# Patient Record
Sex: FEMALE | Race: OTHER | HISPANIC OR LATINO | Employment: FULL TIME | URBAN - METROPOLITAN AREA
[De-identification: names, ages, dates, MRNs, and addresses within clinical notes are randomized per-mention and may not be internally consistent; named-entity substitution may affect disease eponyms.]

---

## 2017-04-20 ENCOUNTER — EH NON-PROVIDER (OUTPATIENT)
Dept: OCCUPATIONAL MEDICINE | Facility: CLINIC | Age: 47
End: 2017-04-20

## 2017-04-20 DIAGNOSIS — Z23 NEED FOR HEPATITIS A VACCINATION: ICD-10-CM

## 2017-04-20 PROCEDURE — 90632 HEPA VACCINE ADULT IM: CPT | Performed by: PREVENTIVE MEDICINE

## 2017-05-23 ENCOUNTER — HOSPITAL ENCOUNTER (OUTPATIENT)
Facility: MEDICAL CENTER | Age: 47
End: 2017-05-23
Attending: FAMILY MEDICINE
Payer: COMMERCIAL

## 2017-05-23 LAB
ALBUMIN SERPL BCP-MCNC: 3.9 G/DL (ref 3.2–4.9)
ALBUMIN/GLOB SERPL: 1.4 G/DL
ALP SERPL-CCNC: 68 U/L (ref 30–99)
ALT SERPL-CCNC: 9 U/L (ref 2–50)
ANION GAP SERPL CALC-SCNC: 7 MMOL/L (ref 0–11.9)
AST SERPL-CCNC: 13 U/L (ref 12–45)
BASOPHILS # BLD AUTO: 0.4 % (ref 0–1.8)
BASOPHILS # BLD: 0.02 K/UL (ref 0–0.12)
BILIRUB SERPL-MCNC: 0.6 MG/DL (ref 0.1–1.5)
BUN SERPL-MCNC: 12 MG/DL (ref 8–22)
CALCIUM SERPL-MCNC: 8.8 MG/DL (ref 8.5–10.5)
CHLORIDE SERPL-SCNC: 108 MMOL/L (ref 96–112)
CHOLEST SERPL-MCNC: 167 MG/DL (ref 100–199)
CO2 SERPL-SCNC: 24 MMOL/L (ref 20–33)
CREAT SERPL-MCNC: 0.62 MG/DL (ref 0.5–1.4)
EOSINOPHIL # BLD AUTO: 0.12 K/UL (ref 0–0.51)
EOSINOPHIL NFR BLD: 2.6 % (ref 0–6.9)
ERYTHROCYTE [DISTWIDTH] IN BLOOD BY AUTOMATED COUNT: 46.6 FL (ref 35.9–50)
GFR SERPL CREATININE-BSD FRML MDRD: >60 ML/MIN/1.73 M 2
GLOBULIN SER CALC-MCNC: 2.8 G/DL (ref 1.9–3.5)
GLUCOSE SERPL-MCNC: 80 MG/DL (ref 65–99)
HCT VFR BLD AUTO: 38 % (ref 37–47)
HDLC SERPL-MCNC: 55 MG/DL
HGB BLD-MCNC: 11.9 G/DL (ref 12–16)
IMM GRANULOCYTES # BLD AUTO: 0.01 K/UL (ref 0–0.11)
IMM GRANULOCYTES NFR BLD AUTO: 0.2 % (ref 0–0.9)
LDLC SERPL CALC-MCNC: 92 MG/DL
LYMPHOCYTES # BLD AUTO: 1.1 K/UL (ref 1–4.8)
LYMPHOCYTES NFR BLD: 24 % (ref 22–41)
MCH RBC QN AUTO: 32.1 PG (ref 27–33)
MCHC RBC AUTO-ENTMCNC: 31.3 G/DL (ref 33.6–35)
MCV RBC AUTO: 102.4 FL (ref 81.4–97.8)
MONOCYTES # BLD AUTO: 0.32 K/UL (ref 0–0.85)
MONOCYTES NFR BLD AUTO: 7 % (ref 0–13.4)
NEUTROPHILS # BLD AUTO: 3.02 K/UL (ref 2–7.15)
NEUTROPHILS NFR BLD: 65.8 % (ref 44–72)
NRBC # BLD AUTO: 0.02 K/UL
NRBC BLD AUTO-RTO: 0.4 /100 WBC
PLATELET # BLD AUTO: 285 K/UL (ref 164–446)
PMV BLD AUTO: 11.2 FL (ref 9–12.9)
POTASSIUM SERPL-SCNC: 3.7 MMOL/L (ref 3.6–5.5)
PROT SERPL-MCNC: 6.7 G/DL (ref 6–8.2)
RBC # BLD AUTO: 3.71 M/UL (ref 4.2–5.4)
SODIUM SERPL-SCNC: 139 MMOL/L (ref 135–145)
TRIGL SERPL-MCNC: 99 MG/DL (ref 0–149)
TSH SERPL DL<=0.005 MIU/L-ACNC: 2.47 UIU/ML (ref 0.3–3.7)
WBC # BLD AUTO: 4.6 K/UL (ref 4.8–10.8)

## 2017-05-23 PROCEDURE — 85025 COMPLETE CBC W/AUTO DIFF WBC: CPT

## 2017-05-23 PROCEDURE — 84443 ASSAY THYROID STIM HORMONE: CPT

## 2017-05-23 PROCEDURE — 80053 COMPREHEN METABOLIC PANEL: CPT

## 2017-05-23 PROCEDURE — 80061 LIPID PANEL: CPT

## 2017-07-13 ENCOUNTER — HOSPITAL ENCOUNTER (OUTPATIENT)
Facility: MEDICAL CENTER | Age: 47
End: 2017-07-13
Attending: FAMILY MEDICINE
Payer: COMMERCIAL

## 2017-07-13 LAB
BASOPHILS # BLD AUTO: 0.3 % (ref 0–1.8)
BASOPHILS # BLD: 0.02 K/UL (ref 0–0.12)
EOSINOPHIL # BLD AUTO: 0.09 K/UL (ref 0–0.51)
EOSINOPHIL NFR BLD: 1.5 % (ref 0–6.9)
ERYTHROCYTE [DISTWIDTH] IN BLOOD BY AUTOMATED COUNT: 41.5 FL (ref 35.9–50)
FERRITIN SERPL-MCNC: 38.8 NG/ML (ref 10–291)
HCT VFR BLD AUTO: 37.3 % (ref 37–47)
HGB BLD-MCNC: 12 G/DL (ref 12–16)
IMM GRANULOCYTES # BLD AUTO: 0.01 K/UL (ref 0–0.11)
IMM GRANULOCYTES NFR BLD AUTO: 0.2 % (ref 0–0.9)
IRON SATN MFR SERPL: 18 % (ref 15–55)
IRON SERPL-MCNC: 66 UG/DL (ref 40–170)
LYMPHOCYTES # BLD AUTO: 1.58 K/UL (ref 1–4.8)
LYMPHOCYTES NFR BLD: 26.9 % (ref 22–41)
MCH RBC QN AUTO: 31.2 PG (ref 27–33)
MCHC RBC AUTO-ENTMCNC: 32.2 G/DL (ref 33.6–35)
MCV RBC AUTO: 96.9 FL (ref 81.4–97.8)
MONOCYTES # BLD AUTO: 0.38 K/UL (ref 0–0.85)
MONOCYTES NFR BLD AUTO: 6.5 % (ref 0–13.4)
NEUTROPHILS # BLD AUTO: 3.8 K/UL (ref 2–7.15)
NEUTROPHILS NFR BLD: 64.6 % (ref 44–72)
NRBC # BLD AUTO: 0 K/UL
NRBC BLD AUTO-RTO: 0 /100 WBC
PLATELET # BLD AUTO: 270 K/UL (ref 164–446)
PMV BLD AUTO: 11.1 FL (ref 9–12.9)
RBC # BLD AUTO: 3.85 M/UL (ref 4.2–5.4)
TIBC SERPL-MCNC: 367 UG/DL (ref 250–450)
WBC # BLD AUTO: 5.9 K/UL (ref 4.8–10.8)

## 2017-07-13 PROCEDURE — 83540 ASSAY OF IRON: CPT

## 2017-07-13 PROCEDURE — 83550 IRON BINDING TEST: CPT

## 2017-07-13 PROCEDURE — 82728 ASSAY OF FERRITIN: CPT

## 2017-07-13 PROCEDURE — 85025 COMPLETE CBC W/AUTO DIFF WBC: CPT

## 2017-08-08 ENCOUNTER — HOSPITAL ENCOUNTER (OUTPATIENT)
Facility: MEDICAL CENTER | Age: 47
End: 2017-08-08
Attending: FAMILY MEDICINE
Payer: COMMERCIAL

## 2017-08-08 LAB — CYTOLOGY REG CYTOL: NORMAL

## 2017-08-08 PROCEDURE — 88175 CYTOPATH C/V AUTO FLUID REDO: CPT

## 2017-09-22 ENCOUNTER — TELEPHONE (OUTPATIENT)
Dept: RADIOLOGY | Facility: MEDICAL CENTER | Age: 47
End: 2017-09-22

## 2017-09-25 ENCOUNTER — HOSPITAL ENCOUNTER (OUTPATIENT)
Dept: RADIOLOGY | Facility: MEDICAL CENTER | Age: 47
End: 2017-09-25
Attending: FAMILY MEDICINE
Payer: COMMERCIAL

## 2017-09-25 DIAGNOSIS — N64.9 DISORDER OF BREAST: ICD-10-CM

## 2017-09-25 PROCEDURE — G0279 TOMOSYNTHESIS, MAMMO: HCPCS

## 2017-10-01 ENCOUNTER — IMMUNIZATION (OUTPATIENT)
Dept: OCCUPATIONAL MEDICINE | Facility: CLINIC | Age: 47
End: 2017-10-01

## 2017-10-01 DIAGNOSIS — Z23 NEED FOR VACCINATION: ICD-10-CM

## 2017-10-01 PROCEDURE — 90686 IIV4 VACC NO PRSV 0.5 ML IM: CPT | Performed by: PREVENTIVE MEDICINE

## 2018-07-25 ENCOUNTER — HOSPITAL ENCOUNTER (OUTPATIENT)
Dept: LAB | Facility: MEDICAL CENTER | Age: 48
End: 2018-07-25
Payer: COMMERCIAL

## 2018-07-25 LAB
BDY FAT % MEASURED: 36.3 %
BP DIAS: 67 MMHG
BP SYS: 126 MMHG
CHOLEST SERPL-MCNC: 171 MG/DL (ref 100–199)
DIABETES HTDIA: NO
EVENT NAME HTEVT: NORMAL
FASTING HTFAS: YES
GLUCOSE SERPL-MCNC: 92 MG/DL (ref 65–99)
HDLC SERPL-MCNC: 53 MG/DL
HYPERTENSION HTHYP: NO
LDLC SERPL CALC-MCNC: 100 MG/DL
SCREENING LOC CITY HTCIT: NORMAL
SCREENING LOC STATE HTSTA: NORMAL
SCREENING LOCATION HTLOC: NORMAL
SMOKING HTSMO: NO
SUBSCRIBER ID HTSID: NORMAL
TRIGL SERPL-MCNC: 88 MG/DL (ref 0–149)

## 2018-07-25 PROCEDURE — S5190 WELLNESS ASSESSMENT BY NONPH: HCPCS

## 2018-07-25 PROCEDURE — 82947 ASSAY GLUCOSE BLOOD QUANT: CPT

## 2018-07-25 PROCEDURE — 36415 COLL VENOUS BLD VENIPUNCTURE: CPT

## 2018-07-25 PROCEDURE — 80061 LIPID PANEL: CPT

## 2018-08-13 ENCOUNTER — HOSPITAL ENCOUNTER (OUTPATIENT)
Facility: MEDICAL CENTER | Age: 48
End: 2018-08-13
Attending: NURSE PRACTITIONER
Payer: COMMERCIAL

## 2018-08-13 ENCOUNTER — OFFICE VISIT (OUTPATIENT)
Dept: MEDICAL GROUP | Facility: MEDICAL CENTER | Age: 48
End: 2018-08-13
Payer: COMMERCIAL

## 2018-08-13 VITALS
TEMPERATURE: 97.2 F | HEART RATE: 66 BPM | SYSTOLIC BLOOD PRESSURE: 116 MMHG | OXYGEN SATURATION: 97 % | WEIGHT: 166 LBS | RESPIRATION RATE: 16 BRPM | DIASTOLIC BLOOD PRESSURE: 72 MMHG | HEIGHT: 64 IN | BODY MASS INDEX: 28.34 KG/M2

## 2018-08-13 DIAGNOSIS — Z11.51 SPECIAL SCREENING EXAMINATION FOR HUMAN PAPILLOMAVIRUS (HPV): ICD-10-CM

## 2018-08-13 DIAGNOSIS — N88.8 MASS OF CERVIX: ICD-10-CM

## 2018-08-13 DIAGNOSIS — Z12.4 ROUTINE CERVICAL SMEAR: ICD-10-CM

## 2018-08-13 DIAGNOSIS — Z01.419 PAP SMEAR, LOW-RISK: ICD-10-CM

## 2018-08-13 PROCEDURE — 88175 CYTOPATH C/V AUTO FLUID REDO: CPT

## 2018-08-13 PROCEDURE — 99386 PREV VISIT NEW AGE 40-64: CPT | Performed by: NURSE PRACTITIONER

## 2018-08-13 PROCEDURE — 87624 HPV HI-RISK TYP POOLED RSLT: CPT

## 2018-08-13 ASSESSMENT — PATIENT HEALTH QUESTIONNAIRE - PHQ9: CLINICAL INTERPRETATION OF PHQ2 SCORE: 0

## 2018-08-13 NOTE — PROGRESS NOTES
"Subjective:      Mary Beth Kiser is a 47 y.o. female who presents with Establish Care (PAP )    CC: Patient is here today to establish care as well as requesting yearly Pap smear.        HPI Mary Beth Kiser states her last Pap smear was one year ago at her previous PCP office, , and it was normal. She does report something about cysts which may have been on her ovaries which were picked up on an ultrasound in the past by Dr. Barkley but was told everything was normal. She states she did go through a full year without a menstrual period but then returned 3 months ago. She has no abdominal pain and states she is otherwise healthy. She has varied but not currently sexually active because her spouse is not around.      eHistory reviewed. No pertinent family history.   Social History   Substance Use Topics   • Smoking status: Never Smoker   • Smokeless tobacco: Never Used   • Alcohol use No     No current outpatient prescriptions on file.     No current facility-administered medications for this visit.    History reviewed. No pertinent past medical history.    Review of Systems   All other systems reviewed and are negative.         Objective:     /72   Pulse 66   Temp 36.2 °C (97.2 °F)   Resp 16   Ht 1.626 m (5' 4\")   Wt 75.3 kg (166 lb)   SpO2 97%   BMI 28.49 kg/m²      Physical Exam   Constitutional: She is oriented to person, place, and time. She appears well-developed and well-nourished. No distress.   HENT:   Head: Normocephalic and atraumatic.   Right Ear: External ear normal.   Left Ear: External ear normal.   Nose: Nose normal.   Eyes: Right eye exhibits no discharge. Left eye exhibits no discharge.   Neck: Normal range of motion. Neck supple. No thyromegaly present.   Cardiovascular: Normal rate, regular rhythm and normal heart sounds.  Exam reveals no gallop and no friction rub.    No murmur heard.  Pulmonary/Chest: Effort normal and breath sounds normal. She has no wheezes. She " has no rales.   Abdominal: Hernia confirmed negative in the right inguinal area and confirmed negative in the left inguinal area.   Genitourinary: Vagina normal. Pelvic exam was performed with patient in the knee-chest position. There is no rash on the right labia. There is no rash on the left labia.   Genitourinary Comments: Nodular area palpated and visualized on cervix.   Musculoskeletal: She exhibits no edema or tenderness.   Neurological: She is alert and oriented to person, place, and time. She displays normal reflexes.   Skin: Skin is warm and dry. No rash noted. She is not diaphoretic.   Psychiatric: She has a normal mood and affect. Her behavior is normal. Judgment and thought content normal.   Nursing note and vitals reviewed.              Assessment/Plan:     1. Pap smear, low-risk    - THINPREP PAP WITH HPV; Future    2. Routine cervical smear    - THINPREP PAP WITH HPV; Future    3. Special screening examination for human papillomavirus (HPV)    - THINPREP PAP WITH HPV; Future    4. Mass of cervix  Patient has nodular area on cervix and also reports having gone over a year without menstrual period and then having vaginal bleeding again. Therefore, I have ordered a transvaginal ultrasound and will also send patient to gynecology for further evaluation. I will share patient's Pap smear results with her when they return.  - REFERRAL TO GYNECOLOGY  - US-GYN-PELVIS TRANSVAGINAL; Future

## 2018-08-14 DIAGNOSIS — Z01.419 PAP SMEAR, LOW-RISK: ICD-10-CM

## 2018-08-14 DIAGNOSIS — Z12.4 ROUTINE CERVICAL SMEAR: ICD-10-CM

## 2018-08-14 DIAGNOSIS — Z11.51 SPECIAL SCREENING EXAMINATION FOR HUMAN PAPILLOMAVIRUS (HPV): ICD-10-CM

## 2018-08-15 LAB
CYTOLOGY REG CYTOL: NORMAL
HPV HR 12 DNA CVX QL NAA+PROBE: NEGATIVE
HPV16 DNA SPEC QL NAA+PROBE: NEGATIVE
HPV18 DNA SPEC QL NAA+PROBE: NEGATIVE
SPECIMEN SOURCE: NORMAL

## 2018-09-19 ENCOUNTER — IMMUNIZATION (OUTPATIENT)
Dept: OCCUPATIONAL MEDICINE | Facility: CLINIC | Age: 48
End: 2018-09-19

## 2018-09-19 DIAGNOSIS — Z23 NEED FOR VACCINATION: ICD-10-CM

## 2018-09-19 PROCEDURE — 90686 IIV4 VACC NO PRSV 0.5 ML IM: CPT | Performed by: PREVENTIVE MEDICINE

## 2018-09-26 ENCOUNTER — APPOINTMENT (OUTPATIENT)
Dept: RADIOLOGY | Facility: MEDICAL CENTER | Age: 48
End: 2018-09-26
Attending: NURSE PRACTITIONER
Payer: COMMERCIAL

## 2018-10-03 ENCOUNTER — APPOINTMENT (OUTPATIENT)
Dept: RADIOLOGY | Facility: MEDICAL CENTER | Age: 48
End: 2018-10-03
Attending: NURSE PRACTITIONER
Payer: COMMERCIAL

## 2019-05-24 ENCOUNTER — HOSPITAL ENCOUNTER (OUTPATIENT)
Dept: RADIOLOGY | Facility: MEDICAL CENTER | Age: 49
End: 2019-05-24
Attending: NURSE PRACTITIONER
Payer: COMMERCIAL

## 2019-05-24 ENCOUNTER — OFFICE VISIT (OUTPATIENT)
Dept: URGENT CARE | Facility: PHYSICIAN GROUP | Age: 49
End: 2019-05-24
Payer: COMMERCIAL

## 2019-05-24 VITALS
DIASTOLIC BLOOD PRESSURE: 70 MMHG | OXYGEN SATURATION: 95 % | HEART RATE: 84 BPM | TEMPERATURE: 97.3 F | HEIGHT: 64 IN | RESPIRATION RATE: 14 BRPM | BODY MASS INDEX: 29.02 KG/M2 | WEIGHT: 170 LBS | SYSTOLIC BLOOD PRESSURE: 118 MMHG

## 2019-05-24 DIAGNOSIS — M54.41 ACUTE BILATERAL LOW BACK PAIN WITH BILATERAL SCIATICA: ICD-10-CM

## 2019-05-24 DIAGNOSIS — M43.10 ANTEROLISTHESIS: ICD-10-CM

## 2019-05-24 DIAGNOSIS — M54.42 ACUTE BILATERAL LOW BACK PAIN WITH BILATERAL SCIATICA: ICD-10-CM

## 2019-05-24 DIAGNOSIS — M54.42 ACUTE BILATERAL LOW BACK PAIN WITH BILATERAL SCIATICA: Primary | ICD-10-CM

## 2019-05-24 DIAGNOSIS — M51.36 DEGENERATIVE DISC DISEASE, LUMBAR: ICD-10-CM

## 2019-05-24 DIAGNOSIS — M54.41 ACUTE BILATERAL LOW BACK PAIN WITH BILATERAL SCIATICA: Primary | ICD-10-CM

## 2019-05-24 PROCEDURE — 72100 X-RAY EXAM L-S SPINE 2/3 VWS: CPT

## 2019-05-24 PROCEDURE — 99204 OFFICE O/P NEW MOD 45 MIN: CPT | Performed by: NURSE PRACTITIONER

## 2019-05-24 RX ORDER — METHYLPREDNISOLONE 4 MG/1
TABLET ORAL
Qty: 1 KIT | Refills: 0 | Status: SHIPPED | OUTPATIENT
Start: 2019-05-25 | End: 2022-11-20

## 2019-05-24 RX ORDER — CYCLOBENZAPRINE HCL 10 MG
10 TABLET ORAL 3 TIMES DAILY PRN
Qty: 30 TAB | Refills: 0 | Status: SHIPPED | OUTPATIENT
Start: 2019-05-24

## 2019-05-24 RX ORDER — METHYLPREDNISOLONE SODIUM SUCCINATE 125 MG/2ML
125 INJECTION, POWDER, LYOPHILIZED, FOR SOLUTION INTRAMUSCULAR; INTRAVENOUS ONCE
Status: COMPLETED | OUTPATIENT
Start: 2019-05-24 | End: 2019-05-24

## 2019-05-24 RX ADMIN — METHYLPREDNISOLONE SODIUM SUCCINATE 125 MG: 125 INJECTION, POWDER, LYOPHILIZED, FOR SOLUTION INTRAMUSCULAR; INTRAVENOUS at 13:51

## 2019-05-24 ASSESSMENT — ENCOUNTER SYMPTOMS
BOWEL INCONTINENCE: 0
FOCAL WEAKNESS: 0
RESPIRATORY NEGATIVE: 1
CARDIOVASCULAR NEGATIVE: 1
NUMBNESS: 0
LEG PAIN: 1
NEUROLOGICAL NEGATIVE: 1
BACK PAIN: 1
CONSTITUTIONAL NEGATIVE: 1
PARESTHESIAS: 0
WEAKNESS: 0
SENSORY CHANGE: 0
TINGLING: 0
PARESIS: 0
PERIANAL NUMBNESS: 0

## 2019-05-24 ASSESSMENT — PAIN SCALES - GENERAL: PAINLEVEL: 9=SEVERE PAIN

## 2019-05-24 NOTE — PATIENT INSTRUCTIONS
Back Pain, Adult  Back pain is very common in adults. The cause of back pain is rarely dangerous and the pain often gets better over time. The cause of your back pain may not be known. Some common causes of back pain include:  · Strain of the muscles or ligaments supporting the spine.  · Wear and tear (degeneration) of the spinal disks.  · Arthritis.  · Direct injury to the back.  For many people, back pain may return. Since back pain is rarely dangerous, most people can learn to manage this condition on their own.  Follow these instructions at home:  Watch your back pain for any changes. The following actions may help to lessen any discomfort you are feeling:  · Remain active. It is stressful on your back to sit or  one place for long periods of time. Do not sit, drive, or  one place for more than 30 minutes at a time. Take short walks on even surfaces as soon as you are able. Try to increase the length of time you walk each day.  · Exercise regularly as directed by your health care provider. Exercise helps your back heal faster. It also helps avoid future injury by keeping your muscles strong and flexible.  · Do not stay in bed. Resting more than 1-2 days can delay your recovery.  · Pay attention to your body when you bend and lift. The most comfortable positions are those that put less stress on your recovering back. Always use proper lifting techniques, including:  ¨ Bending your knees.  ¨ Keeping the load close to your body.  ¨ Avoiding twisting.  · Find a comfortable position to sleep. Use a firm mattress and lie on your side with your knees slightly bent. If you lie on your back, put a pillow under your knees.  · Avoid feeling anxious or stressed. Stress increases muscle tension and can worsen back pain. It is important to recognize when you are anxious or stressed and learn ways to manage it, such as with exercise.  · Take medicines only as directed by your health care provider.  Over-the-counter medicines to reduce pain and inflammation are often the most helpful. Your health care provider may prescribe muscle relaxant drugs. These medicines help dull your pain so you can more quickly return to your normal activities and healthy exercise.  · Apply ice to the injured area:  ¨ Put ice in a plastic bag.  ¨ Place a towel between your skin and the bag.  ¨ Leave the ice on for 20 minutes, 2-3 times a day for the first 2-3 days. After that, ice and heat may be alternated to reduce pain and spasms.  · Maintain a healthy weight. Excess weight puts extra stress on your back and makes it difficult to maintain good posture.  Contact a health care provider if:  · You have pain that is not relieved with rest or medicine.  · You have increasing pain going down into the legs or buttocks.  · You have pain that does not improve in one week.  · You have night pain.  · You lose weight.  · You have a fever or chills.  Get help right away if:  · You develop new bowel or bladder control problems.  · You have unusual weakness or numbness in your arms or legs.  · You develop nausea or vomiting.  · You develop abdominal pain.  · You feel faint.  This information is not intended to replace advice given to you by your health care provider. Make sure you discuss any questions you have with your health care provider.  Document Released: 12/18/2006 Document Revised: 04/27/2017 Document Reviewed: 04/21/2015  [employer] Interactive Patient Education © 2017 [employer]

## 2019-05-24 NOTE — PROGRESS NOTES
Subjective:     Mary Beth Kiser is a 48 y.o. female who presents for Back Pain (x this morning, lower back, pain radiates to both legs )       Back Pain   This is a new problem. The current episode started today. The problem has been gradually worsening since onset. The pain is present in the lumbar spine. The pain radiates to the right thigh and left thigh. The pain is at a severity of 9/10. Exacerbated by: movement, palpation. Associated symptoms include leg pain. Pertinent negatives include no bladder incontinence, bowel incontinence, numbness, paresis, paresthesias, perianal numbness, tingling or weakness. Treatments tried: Motrin. The treatment provided no relief.   Denies a history of back problems or surgeries. Denies trauma.    PMH:  has no past medical history of Breast cancer (HCC) or Diabetes (HCC).    MEDS:   Current Outpatient Prescriptions:   •  [START ON 5/25/2019] MethylPREDNISolone (MEDROL DOSEPAK) 4 MG Tablet Therapy Pack, Use as directed on package, Disp: 1 Kit, Rfl: 0  •  cyclobenzaprine (FLEXERIL) 10 MG Tab, Take 1 Tab by mouth 3 times a day as needed for Muscle Spasms., Disp: 30 Tab, Rfl: 0    ALLERGIES: No Known Allergies    SURGHX: No past surgical history on file.    SOCHX:  reports that she has never smoked. She has never used smokeless tobacco. She reports that she does not drink alcohol or use drugs.     FH: Reviewed with patient, not pertinent to this visit.     Review of Systems   Constitutional: Negative.  Negative for malaise/fatigue.   Respiratory: Negative.    Cardiovascular: Negative.    Gastrointestinal: Negative for bowel incontinence.   Genitourinary: Negative.  Negative for bladder incontinence.   Musculoskeletal: Positive for back pain.   Neurological: Negative.  Negative for tingling, sensory change, focal weakness, weakness, numbness and paresthesias.   All other systems reviewed and are negative.    Objective:     /70   Pulse 84   Temp 36.3 °C (97.3 °F)  "(Temporal)   Resp 14   Ht 1.626 m (5' 4\")   Wt 77.1 kg (170 lb)   SpO2 95%   BMI 29.18 kg/m²     Physical Exam   Constitutional: She is oriented to person, place, and time. She appears well-developed and well-nourished. She is cooperative.  Non-toxic appearance. No distress.   HENT:   Right Ear: External ear normal.   Left Ear: External ear normal.   Nose: Nose normal.   Mouth/Throat: Mucous membranes are normal.   Eyes: Conjunctivae and EOM are normal.   Neck: Normal range of motion.   Cardiovascular: Normal rate, regular rhythm, normal heart sounds and normal pulses.    Pulmonary/Chest: Effort normal and breath sounds normal. No respiratory distress. She has no decreased breath sounds.   Abdominal: Bowel sounds are normal.   Musculoskeletal: She exhibits no deformity.        Lumbar back: She exhibits decreased range of motion (limited due to pain), tenderness, pain and spasm. She exhibits no swelling, no deformity and no laceration.        Back:    Neurological: She is alert and oriented to person, place, and time. She has normal strength. No sensory deficit.   Skin: Skin is warm, dry and intact. Capillary refill takes less than 2 seconds.   Psychiatric: She has a normal mood and affect. Her behavior is normal.   Vitals reviewed.    X-ray of lumbar spine:    Narrative     5/24/2019 12:52 PM    HISTORY/REASON FOR EXAM: Atraumatic Pain    TECHNIQUE/ EXAM DESCRIPTION AND NUMBER OF VIEWS:  3 weightbearing views of the lumbar spine.    COMPARISON: None.    FINDINGS:  There are 5 non-rib bearing lumbar type vertebral bodies.    No acute fracture is detected. The vertebral body heights are maintained.    The alignment of the lumbar spine is notable for grade 1 L5/S1 anterolisthesis with pars interarticularis defects suspected. There is mild disc height loss    The intervertebral disk spaces are otherwise preserved.     Impression     Grade 1 L5/S1 anterolisthesis, moderate degenerative disc disease and probable pars " interarticularis defects     Reading Provider Reading Date   Lei Velasquez M.D. May 24, 2019     Signing Provider Signing Date Signing Time   Lei Velasquez M.D. May 24, 2019  1:19 PM        Assessment/Plan:     1. Acute bilateral low back pain with bilateral sciatica  - DX-LUMBAR SPINE-2 OR 3 VIEWS; Future  - REFERRAL TO SPINE SURGERY  - methylPREDNISolone sod succ (SOLU-MEDROL) 125 MG injection 125 mg; 2 mL by Intramuscular route Once.  - MethylPREDNISolone (MEDROL DOSEPAK) 4 MG Tablet Therapy Pack; Use as directed on package  Dispense: 1 Kit; Refill: 0  - cyclobenzaprine (FLEXERIL) 10 MG Tab; Take 1 Tab by mouth 3 times a day as needed for Muscle Spasms.  Dispense: 30 Tab; Refill: 0    2. Anterolisthesis  - REFERRAL TO SPINE SURGERY    3. Degenerative disc disease, lumbar  - REFERRAL TO SPINE SURGERY    X-ray of lumbar spine ordered. Radiology report reviewed. Significant for grade 1 L5/S1 anterolisthesis with moderate DDD and probably pars interarticularis defects.    Patient reports ibuprofen has not been helping with the pain.  Solu-Medrol 125 mg IM given in clinic. Patient tolerated well. Rx sent electronically for Medrol Dosepak, patient instructed to start tomorrow. Rx for muscle relaxant. Advised to avoid NSAIDs while on steroid therapy. May take acetaminophen, perform gentle range of motion exercises, gentle massage, and apply ice alternating with heat. Referral given for spine specialist evaluation.    Patient advised close monitoring and to: Return for 1) Symptoms don't improve or worsen, or go to ER, 2) Follow up with primary care in 7-10 days.    Differential diagnosis, natural history, supportive care, and indications for immediate follow-up discussed. All questions answered. Patient agrees with the plan of care.    Billing note: patient billed as a new patient as the patient has not received any professional medical services from myself or another provider of the same specialty who belongs to  the same group practice within the previous 3 years.

## 2019-05-24 NOTE — LETTER
May 24, 2019         Patient: Mary Beth Kiser   YOB: 1970   Date of Visit: 5/24/2019           To Whom it May Concern:    Mary Beth Kiser was seen in my clinic on 5/24/2019. She may be excused 3/24/2019 and 3/25/2019.  If you have any questions or concerns, please don't hesitate to call.        Sincerely,           MIKE Kaur.  Electronically Signed

## 2019-09-05 ENCOUNTER — HOSPITAL ENCOUNTER (OUTPATIENT)
Dept: LAB | Facility: MEDICAL CENTER | Age: 49
End: 2019-09-05
Payer: COMMERCIAL

## 2019-09-05 LAB
BDY FAT % MEASURED: 32.6 %
BP DIAS: 58 MMHG
BP SYS: 111 MMHG
CHOLEST SERPL-MCNC: 184 MG/DL (ref 100–199)
DIABETES HTDIA: NO
EVENT NAME HTEVT: NORMAL
FASTING HTFAS: YES
GLUCOSE SERPL-MCNC: 91 MG/DL (ref 65–99)
HDLC SERPL-MCNC: 52 MG/DL
HYPERTENSION HTHYP: NO
LDLC SERPL CALC-MCNC: 102 MG/DL
SCREENING LOC CITY HTCIT: NORMAL
SCREENING LOC STATE HTSTA: NORMAL
SCREENING LOCATION HTLOC: NORMAL
SMOKING HTSMO: NO
SUBSCRIBER ID HTSID: NORMAL
TRIGL SERPL-MCNC: 151 MG/DL (ref 0–149)

## 2019-09-05 PROCEDURE — S5190 WELLNESS ASSESSMENT BY NONPH: HCPCS

## 2019-09-05 PROCEDURE — 82947 ASSAY GLUCOSE BLOOD QUANT: CPT

## 2019-09-05 PROCEDURE — 80061 LIPID PANEL: CPT

## 2019-09-05 PROCEDURE — 36415 COLL VENOUS BLD VENIPUNCTURE: CPT

## 2019-09-24 ENCOUNTER — IMMUNIZATION (OUTPATIENT)
Dept: OCCUPATIONAL MEDICINE | Facility: CLINIC | Age: 49
End: 2019-09-24

## 2019-09-24 DIAGNOSIS — Z23 NEED FOR VACCINATION: ICD-10-CM

## 2019-09-24 PROCEDURE — 90686 IIV4 VACC NO PRSV 0.5 ML IM: CPT | Performed by: PREVENTIVE MEDICINE

## 2020-09-25 ENCOUNTER — IMMUNIZATION (OUTPATIENT)
Dept: OCCUPATIONAL MEDICINE | Facility: CLINIC | Age: 50
End: 2020-09-25

## 2020-09-25 DIAGNOSIS — Z23 NEED FOR VACCINATION: ICD-10-CM

## 2020-09-25 PROCEDURE — 90686 IIV4 VACC NO PRSV 0.5 ML IM: CPT | Performed by: NURSE PRACTITIONER

## 2020-12-20 DIAGNOSIS — Z23 NEED FOR VACCINATION: ICD-10-CM

## 2021-01-20 ENCOUNTER — HOSPITAL ENCOUNTER (OUTPATIENT)
Dept: LAB | Facility: MEDICAL CENTER | Age: 51
End: 2021-01-20
Attending: EMERGENCY MEDICINE
Payer: COMMERCIAL

## 2021-01-20 LAB
COVID ORDER STATUS COVID19: NORMAL
SARS-COV-2 RNA RESP QL NAA+PROBE: DETECTED
SPECIMEN SOURCE: ABNORMAL

## 2021-03-18 ENCOUNTER — IMMUNIZATION (OUTPATIENT)
Dept: FAMILY PLANNING/WOMEN'S HEALTH CLINIC | Facility: IMMUNIZATION CENTER | Age: 51
End: 2021-03-18
Attending: FAMILY MEDICINE
Payer: COMMERCIAL

## 2021-03-18 DIAGNOSIS — Z23 NEED FOR VACCINATION: ICD-10-CM

## 2021-03-18 DIAGNOSIS — Z23 ENCOUNTER FOR VACCINATION: Primary | ICD-10-CM

## 2021-03-18 PROCEDURE — 0011A MODERNA SARS-COV-2 VACCINE: CPT

## 2021-03-18 PROCEDURE — 91301 MODERNA SARS-COV-2 VACCINE: CPT

## 2021-04-15 ENCOUNTER — IMMUNIZATION (OUTPATIENT)
Dept: OTHER | Facility: MEDICAL CENTER | Age: 51
End: 2021-04-15
Attending: INTERNAL MEDICINE
Payer: COMMERCIAL

## 2021-04-15 DIAGNOSIS — Z23 ENCOUNTER FOR VACCINATION: Primary | ICD-10-CM

## 2021-04-15 PROCEDURE — 91301 MODERNA SARS-COV-2 VACCINE: CPT

## 2021-04-15 PROCEDURE — 0012A MODERNA SARS-COV-2 VACCINE: CPT

## 2022-01-11 ENCOUNTER — APPOINTMENT (OUTPATIENT)
Dept: PHARMACY | Facility: MEDICAL CENTER | Age: 52
End: 2022-01-11

## 2022-01-11 PROCEDURE — RXMED WILLOW AMBULATORY MEDICATION CHARGE: Performed by: INTERNAL MEDICINE

## 2022-01-12 ENCOUNTER — PHARMACY VISIT (OUTPATIENT)
Dept: PHARMACY | Facility: MEDICAL CENTER | Age: 52
End: 2022-01-12
Payer: COMMERCIAL

## 2022-09-27 ENCOUNTER — IMMUNIZATION (OUTPATIENT)
Dept: OCCUPATIONAL MEDICINE | Facility: CLINIC | Age: 52
End: 2022-09-27

## 2022-09-27 DIAGNOSIS — Z23 NEED FOR VACCINATION: Primary | ICD-10-CM

## 2022-09-30 PROCEDURE — 90686 IIV4 VACC NO PRSV 0.5 ML IM: CPT | Performed by: PREVENTIVE MEDICINE

## 2022-11-08 ENCOUNTER — APPOINTMENT (OUTPATIENT)
Dept: RADIOLOGY | Facility: IMAGING CENTER | Age: 52
End: 2022-11-08
Attending: PHYSICIAN ASSISTANT
Payer: COMMERCIAL

## 2022-11-08 ENCOUNTER — PHARMACY VISIT (OUTPATIENT)
Dept: PHARMACY | Facility: MEDICAL CENTER | Age: 52
End: 2022-11-08
Payer: COMMERCIAL

## 2022-11-08 ENCOUNTER — OCCUPATIONAL MEDICINE (OUTPATIENT)
Dept: URGENT CARE | Facility: CLINIC | Age: 52
End: 2022-11-08
Payer: COMMERCIAL

## 2022-11-08 VITALS
OXYGEN SATURATION: 96 % | DIASTOLIC BLOOD PRESSURE: 68 MMHG | HEART RATE: 72 BPM | HEIGHT: 63 IN | RESPIRATION RATE: 16 BRPM | BODY MASS INDEX: 29.3 KG/M2 | SYSTOLIC BLOOD PRESSURE: 108 MMHG | WEIGHT: 165.4 LBS | TEMPERATURE: 97.8 F

## 2022-11-08 DIAGNOSIS — S67.191A CRUSHING INJURY OF LEFT INDEX FINGER, INITIAL ENCOUNTER: ICD-10-CM

## 2022-11-08 DIAGNOSIS — L03.012 PARONYCHIA OF LEFT INDEX FINGER: ICD-10-CM

## 2022-11-08 DIAGNOSIS — Z02.1 PRE-EMPLOYMENT DRUG SCREENING: ICD-10-CM

## 2022-11-08 LAB
AMP AMPHETAMINE: NORMAL
BAR BARBITURATES: NORMAL
BREATH ALCOHOL COMMENT: NORMAL
BZO BENZODIAZEPINES: NORMAL
COC COCAINE: NORMAL
INT CON NEG: NORMAL
INT CON POS: NORMAL
MDMA ECSTASY: NORMAL
MET METHAMPHETAMINES: NORMAL
MTD METHADONE: NORMAL
OPI OPIATES: NORMAL
OXY OXYCODONE: NORMAL
PCP PHENCYCLIDINE: NORMAL
POC BREATHALIZER: 0 PERCENT (ref 0–0.01)
POC URINE DRUG SCREEN OCDRS: NEGATIVE
THC: NORMAL

## 2022-11-08 PROCEDURE — 82075 ASSAY OF BREATH ETHANOL: CPT | Performed by: PHYSICIAN ASSISTANT

## 2022-11-08 PROCEDURE — 80305 DRUG TEST PRSMV DIR OPT OBS: CPT | Performed by: PHYSICIAN ASSISTANT

## 2022-11-08 PROCEDURE — RXMED WILLOW AMBULATORY MEDICATION CHARGE: Performed by: PHYSICIAN ASSISTANT

## 2022-11-08 PROCEDURE — 73130 X-RAY EXAM OF HAND: CPT | Mod: TC,LT | Performed by: RADIOLOGY

## 2022-11-08 PROCEDURE — 99203 OFFICE O/P NEW LOW 30 MIN: CPT | Performed by: PHYSICIAN ASSISTANT

## 2022-11-08 RX ORDER — CEPHALEXIN 500 MG/1
500 CAPSULE ORAL 4 TIMES DAILY
Qty: 20 CAPSULE | Refills: 0 | Status: SHIPPED | OUTPATIENT
Start: 2022-11-08 | End: 2022-11-13

## 2022-11-08 NOTE — LETTER
"   [facility]  [address] [facility] [facility] [address] [place] [address]  Phone:  [phone] - Fax:  [phone]   [facility] Progress Report and Disability Certification  Date of Service: 11/8/2022   No Show:  No  Date / Time of Next Visit: 11/15/2022 @ 9:00 AM    Claim Information   Patient Name: Mary Beth Kiser  Claim Number:     Employer: [employer]  Date of Injury: 11/3/2022     Insurer / TPA:   Workers Choice ID / SSN:     Occupation: DIETARY  Diagnosis: Diagnoses of Crushing injury of left index finger, initial encounter and Paronychia of left index finger were pertinent to this visit.    Medical Information   Related to Industrial Injury? 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  She states she was holding \"bases\" that are very heavy.  She stacks them adjacent to her workspace and moves them throughout the day.  She states her finger was crushed between 2 stacks of bases.  Patient has tried OTC Tylenol Advil.  Patient has tried Band-Aids and bandaging of finger.  Notes increasing swelling redness and pain over the interim.  Denies past medical history of similar.  Denies other current employment.  Patient is right-hand dominant.   Objective Findings: Gen: AOx3; Head: NC AT; Eyes: PERRLA/EOM; Lungs: NLR; Cardiac: RR by periph pulse exam; Left index finger: Erythema and edema to distal phalanx and surrounding proximal and lateral nail folds, not pointing, no fluctuance, skin intact, no subungual hematoma, trace erythema proximal to DIPJ; neuro: Brisk capillary refill, normal sensation to light touch   Pre-Existing Condition(s):     Assessment:   Initial Visit    Status: Additional Care Required  Permanent Disability:No    Plan:   Comments:Restricted duty, 10 pound restriction to left upper extremity, Rx Keflex, take full course, take with probiotics, OTC NSAIDs/Tylenol, follow-up in " 7 days for repeat evaluation    Diagnostics: X-ray  Comments:NEG    Comments:       Disability Information   Status: Released to Restricted Duty    From:  11/8/2022  Through: 11/15/2022 Restrictions are: Temporary   Physical Restrictions   Sitting:    Standing:    Stooping:    Bending:      Squatting:    Walking:    Climbing:    Pushing:      Pulling:    Other:    Reaching Above Shoulder (L):   Reaching Above Shoulder (R):       Reaching Below Shoulder (L):    Reaching Below Shoulder (R):      Not to exceed Weight Limits   Carrying(hrs):   Weight Limit(lb): < or = to 10 pounds  Comments:LUE Lifting(hrs):   Weight  Limit(lb): < or = to 10 pounds  Comments:LUE   Comments: Restricted duty, 10 pound restriction to left upper extremity, Rx Keflex, take full course, take with probiotics, OTC NSAIDs/Tylenol, follow-up in 7 days for repeat evaluation      Repetitive Actions   Hands: i.e. Fine Manipulations from Grasping:     Feet: i.e. Operating Foot Controls:     Driving / Operate Machinery:     Health Care Provider’s Original or Electronic Signature  Schuyler Coronado P.A.-C. Health Care Provider’s Original or Electronic Signature    Arthur Carey MD         Clinic Name / Location: [address] [place] [address], [place] [address] Clinic Phone Number: Dept: [phone]   Appointment Time: 10:45 Am Visit Start Time: 11:58 AM   Check-In Time:  10:47 Am Visit Discharge Time:  12:52 PM    Original-Treating Physician or Chiropractor    Page 2-Insurer/TPA    Page 3-Employer    Page 4-Employee

## 2022-11-08 NOTE — PROGRESS NOTES
"Subjective:     Mary Beth Kiser is a 52 y.o. 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  She states she was holding \"bases\" that are very heavy.  She stacks them adjacent to her workspace and moves them throughout the day.  She states her finger was crushed between 2 stacks of bases.  Patient has tried OTC Tylenol Advil.  Patient has tried Band-Aids and bandaging of finger.  Notes increasing swelling redness and pain over the interim.  Denies past medical history of similar.  Denies other current employment.  Patient is right-hand dominant.    PMH:   No pertinent past medical history to this problem  MEDS:  Medications were reviewed in EMR  ALLERGIES:  Allergies were reviewed in EMR  FH:   No pertinent family history to this problem    DX hand -      FINDINGS: Bone mineralization is normal. There is no evidence of fracture or dislocation. Soft tissues are normal.     IMPRESSION:     No evidence of fracture or arthropathy.                 Exam Ended: 11/08/22 12:20 PM Last Resulted: 11/08/22 12:48 PM              Objective:     /68 (BP Location: Right arm, Patient Position: Sitting, BP Cuff Size: Adult long)   Pulse 72   Temp 36.6 °C (97.8 °F) (Temporal)   Resp 16   Ht 1.6 m (5' 3\")   Wt 75 kg (165 lb 6.4 oz)   SpO2 96%   BMI 29.30 kg/m²     Gen: AOx3; Head: NC AT; Eyes: PERRLA/EOM; Lungs: NLR; Cardiac: RR by periph pulse exam; Left index finger: Erythema and edema to distal phalanx and surrounding proximal and lateral nail folds, not pointing, no fluctuance, skin intact, no subungual hematoma, trace erythema proximal to DIPJ; neuro: Brisk capillary refill, normal sensation to light touch    Patient sent with aluminum finger splint to help protect distal digit.    Assessment/Plan:       1. Crushing injury of left index finger, initial encounter  - DX-HAND 3+ LEFT; " Future    2. Paronychia of left index finger  - DX-HAND 3+ LEFT; Future    Released to Restricted Duty FROM 11/8/2022 TO 11/15/2022  Restricted duty, 10 pound restriction to left upper extremity, Rx Keflex, take full course, take with probiotics, OTC NSAIDs/Tylenol, follow-up in 7 days for repeat evaluation         Differential diagnosis, natural history, supportive care, and indications for immediate follow-up discussed.    My total time spent caring for the patient on the day of the encounter was 31 minutes.   This does not include time spent on separately billable procedures/tests.

## 2022-11-08 NOTE — LETTER
"EMPLOYEE’S CLAIM FOR COMPENSATION/ REPORT OF INITIAL TREATMENT  FORM C-4    EMPLOYEE’S CLAIM - PROVIDE ALL INFORMATION REQUESTED   First Name  Mary Beth Last Name  Kiser Birthdate                    1970                Sex  female Claim Number (Insurer’s Use Only)    Home Address  [address] Age  52 y.o. 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 (if applicable)  Working    How did this injury or occupational disease occur? (Be specific an answer in detail. Use additional sheet if necessary)  Taking Bases   If you believe that you have an occupational disease, when did you first have knowledge of the disability and it relationship to your employment?  n/a Witnesses to the Accident  tootie stark      Nature of Injury or Occupational Disease  Defer  Part(s) of Body Injured or Affected  Hand (L), ,     I certify that the above is true and correct to the best of my knowledge and that I have provided this information in order to obtain the benefits of [place]  I hereby authorize any physician, chiropractor, surgeon, practitioner, or other person, any hospital, including [facility] or [facility], any medical service organization, any insurance company, or other institution or organization to release to each other, any medical or other information, including benefits paid or payable, pertinent to this injury or disease, except information relative to diagnosis, treatment and/or counseling for AIDS, psychological conditions, alcohol or controlled substances, for which I must give specific authorization.  A Photostat of this authorization shall be as valid as the original.     Date 11/08/2022   Place Employee’s Original or  *Electronic Signature   THIS REPORT MUST BE COMPLETED AND MAILED WITHIN 3 WORKING DAYS OF TREATMENT   Place  [facility]  Name of Facility  [facility]   Date  11/8/2022 Diagnosis and Description of Injury or Occupational Disease  (S67.191A) Crushing injury of left index finger, initial encounter  (L03.012) Paronychia of left index finger Is there evidence the injured employee was under the influence of alcohol and/or another controlled substance at the time of accident?  ? No ? Yes (if yes, please explain)    Hour  11:58 AM   Diagnoses of Crushing injury of left index finger, initial encounter and Paronychia of left index finger were pertinent to this visit. No   Treatment  Restricted duty, 10 pound restriction to left upper extremity, Rx Keflex, take full course, take with probiotics, OTC NSAIDs/Tylenol, follow-up in 7 days for repeat evaluation    Have you advised the patient to remain off work five days or     more?    X-Ray Findings  Negative   ? Yes Indicate dates:   From   To      From information given by the employee, together with medical evidence, can        you directly connect this injury or occupational disease as job incurred?  Yes ? No If no, is the injured employee  "capable of:  ? full duty  No ? modified duty  Yes   Is additional medical care by a physician indicated?  Yes If Modified Duty, Specify any Limitations / Restrictions  Restricted duty, 10 pound restriction to left upper extremity, Rx Keflex, take full course, take with probiotics, OTC NSAIDs/Tylenol, follow-up in 7 days for repeat evaluation     Do you know of any previous injury or disease contributing to this condition or occupational disease?  ? Yes ? No (Explain if yes)                          No   Date  11/8/2022 Print Health Care Provider's   Schuyler Coronado P.A.-C. I certify the employer’s copy of  this form was mailed on:   Address  [address] [address] Insurer’s Use Only     [facility] Zip  [address]    Provider’s Tax ID Number  [id number] Telephone  Dept: [phone]             Health Care Provider’s Original or Electronic Signature  e-SCHUYLER Andrew P.A.-C. Degree (MD,DO, DC,PALENKA,APRN)   PALENKA      * Complete and attach Release of Information (Form C-4A) when injured employee signs C-4 Form electronically  ORIGINAL - TREATING HEALTHCARE PROVIDER PAGE 2 - INSURER/TPA PAGE 3 - EMPLOYER PAGE 4 - EMPLOYEE             Form C-4 (rev.08/21)           BRIEF DESCRIPTION OF RIGHTS AND BENEFITS  (Pursuant to [place]RS 616C.050)    Notice of Injury or Occupational Disease (Incident Report Form C-1): If an injury or occupational disease (OD) arises out of and in the course of employment, you must provide written notice to your employer as soon as practicable, but no later than 7 days after the accident or OD. Your employer shall maintain a sufficient supply of the required forms.    Claim for Compensation (Form C-4): If medical treatment is sought, the form C-4 is available at the place of initial treatment. A completed \"Claim for Compensation\" (Form C-4) must be filed within 90 days after an accident or OD. The treating physician or chiropractor must, within 3 working days after treatment, complete and " mail to the employer, the employer's insurer and third-party , the Claim for Compensation.    Medical Treatment: If you require medical treatment for your on-the-job injury or OD, you may be required to select a physician or chiropractor from a list provided by your workers’ compensation insurer, if it has contracted with an Organization for Managed Care (MCO) or Preferred Provider Organization (PPO) or providers of health care. If your employer has not entered into a contract with an MCO or PPO, you may select a physician or chiropractor from the Panel of Physicians and Chiropractors. Any medical costs related to your industrial injury or OD will be paid by your insurer.    Temporary Total Disability (TTD): If your doctor has certified that you are unable to work for a period of at least 5 consecutive days, or 5 cumulative days in a 20-day period, or places restrictions on you that your employer does not accommodate, you may be entitled to TTD compensation.    Temporary Partial Disability (TPD): If the wage you receive upon reemployment is less than the compensation for TTD to which you are entitled, the insurer may be required to pay you TPD compensation to make up the difference. TPD can only be paid for a maximum of 24 months.    Permanent Partial Disability (PPD): When your medical condition is stable and there is an indication of a PPD as a result of your injury or OD, within 30 days, your insurer must arrange for an evaluation by a rating physician or chiropractor to determine the degree of your PPD. The amount of your PPD award depends on the date of injury, the results of the PPD evaluation, your age and wage.    Permanent Total Disability (PTD): If you are medically certified by a treating physician or chiropractor as permanently and totally disabled and have been granted a PTD status by your insurer, you are entitled to receive monthly benefits not to exceed 66 2/3% of your average monthly  wage. The amount of your PTD payments is subject to reduction if you previously received a lump-sum PPD award.    Vocational Rehabilitation Services: You may be eligible for vocational rehabilitation services if you are unable to return to the job due to a permanent physical impairment or permanent restrictions as a result of your injury or occupational disease.    Transportation and Per Zachary Reimbursement: You may be eligible for travel expenses and per zachary associated with medical treatment.    Reopening: You may be able to reopen your claim if your condition worsens after claim closure.     Appeal Process: If you disagree with a written determination issued by the insurer or the insurer does not respond to your request, you may appeal to the Department of Administration, , by following the instructions contained in your determination letter. You must appeal the determination within 70 days from the date of the determination letter at [address], [address], [place], [place] [address], or [address], [address], [place] [address]. If you disagree with the  decision, you may appeal to the Department of Administration, . You must file your appeal within 30 days from the date of the  decision letter at [address], [address], [place], [place] [address], or [address], [address], [place] [address]. If you disagree with a decision of an , you may file a petition for judicial review with the District Court. You must do so within 30 days of the Appeal Officer’s decision. You may be represented by an  at your own expense or you may contact the [facility] for possible representation.    [place]  for Injured Workers (NAIW): If you disagree with a  decision, you may request that NAIW represent you without charge at an  Hearing. For information regarding denial of  benefits, you may contact the [facility] at: [address], [address], [place] [address], [phone], or [address] [facility] [address] [facility], [address], [place] [address], [phone]    To File a Complaint with the Division: If you wish to file a complaint with the  of the Division of Industrial Relations (DIR),  please contact the Workers’ Compensation Section, [address], [address], [place], [place] [address], telephone ([phone]) [phone], or [address], [address], [place], [place] [address], telephone [phone].    For assistance with Workers’ Compensation Issues: You may contact the [facility] Office for Consumer Health Assistance, [address], [address], [place], [place] [address], Toll Free 1-674.324.5415, Web site: [email or web address]/Programs/[email or web address] E-mail: [email or web address]              __________________________________________________________________                                    _________________            Employee Name / Signature                                                                                                                            Date                                                                                                                                                                                                                              D-2 (rev. 10/20)

## 2022-11-11 ENCOUNTER — HOSPITAL ENCOUNTER (OUTPATIENT)
Facility: MEDICAL CENTER | Age: 52
End: 2022-11-11
Attending: NURSE PRACTITIONER
Payer: COMMERCIAL

## 2022-11-11 ENCOUNTER — PHARMACY VISIT (OUTPATIENT)
Dept: PHARMACY | Facility: MEDICAL CENTER | Age: 52
End: 2022-11-11
Payer: COMMERCIAL

## 2022-11-11 ENCOUNTER — OCCUPATIONAL MEDICINE (OUTPATIENT)
Dept: URGENT CARE | Facility: CLINIC | Age: 52
End: 2022-11-11
Payer: COMMERCIAL

## 2022-11-11 VITALS
SYSTOLIC BLOOD PRESSURE: 106 MMHG | RESPIRATION RATE: 18 BRPM | TEMPERATURE: 96.9 F | WEIGHT: 172.5 LBS | HEART RATE: 80 BPM | BODY MASS INDEX: 31.74 KG/M2 | OXYGEN SATURATION: 95 % | DIASTOLIC BLOOD PRESSURE: 72 MMHG | HEIGHT: 62 IN

## 2022-11-11 DIAGNOSIS — L03.012 PARONYCHIA OF LEFT INDEX FINGER: ICD-10-CM

## 2022-11-11 DIAGNOSIS — S67.191D CRUSHING INJURY OF LEFT INDEX FINGER, SUBSEQUENT ENCOUNTER: ICD-10-CM

## 2022-11-11 LAB
GRAM STN SPEC: NORMAL
SIGNIFICANT IND 70042: NORMAL
SITE SITE: NORMAL
SOURCE SOURCE: NORMAL

## 2022-11-11 PROCEDURE — RXMED WILLOW AMBULATORY MEDICATION CHARGE: Performed by: NURSE PRACTITIONER

## 2022-11-11 PROCEDURE — 87205 SMEAR GRAM STAIN: CPT

## 2022-11-11 PROCEDURE — 87070 CULTURE OTHR SPECIMN AEROBIC: CPT

## 2022-11-11 PROCEDURE — 99213 OFFICE O/P EST LOW 20 MIN: CPT | Mod: 25 | Performed by: NURSE PRACTITIONER

## 2022-11-11 PROCEDURE — 87077 CULTURE AEROBIC IDENTIFY: CPT

## 2022-11-11 PROCEDURE — 87186 SC STD MICRODIL/AGAR DIL: CPT

## 2022-11-11 PROCEDURE — 10060 I&D ABSCESS SIMPLE/SINGLE: CPT | Performed by: NURSE PRACTITIONER

## 2022-11-11 RX ORDER — SULFAMETHOXAZOLE AND TRIMETHOPRIM 800; 160 MG/1; MG/1
1 TABLET ORAL 2 TIMES DAILY
Qty: 14 TABLET | Refills: 0 | Status: SHIPPED | OUTPATIENT
Start: 2022-11-11 | End: 2022-11-18

## 2022-11-11 NOTE — LETTER
[facility] [address] [facility] [address] [place] [address]  Phone:  [phone] - Fax:  [phone]   [facility] Progress Report and Disability Certification  Date of Service: 11/11/2022   No Show:  No  Date / Time of Next Visit: 11/13/2022   Claim Information   Patient Name: Mary Beth Kiser  Claim Number:     Employer:  [employer]  Date of Injury: 11/3/2022     Insurer / TPA: Workers Choice  ID / SSN:     Occupation: DIETARY  Diagnosis: Diagnoses of Crushing injury of left index finger, subsequent encounter and Paronychia of left index finger were pertinent to this visit.    Medical Information   Related to Industrial Injury? Yes    Subjective Complaints:  DOI 11/3/22: Patient endorses crush injury to left index finger while at work.  She was seen in urgent care on 11/8/2022 for the same.  X-ray was performed without any bony findings.  She was treated with Keflex for early signs of paronychia.  The patient notes that since yesterday she has had significant increase in swelling and pain to distal aspect of her finger.  She otherwise feels well and denies any fever, chills, malaise.  She is right-hand dominant.   Objective Findings: A/Ox4. NAD.  Left index finger: There is significant swelling with fluctuance noted to lateral and distal aspect of finger, adjacent to nailbed, area is tender, no lymphangitis.  Neurovascular is intact.  Finger has full range of motion.   Pre-Existing Condition(s): Denies   Assessment:   Condition Worsened    Status: Additional Care Required  Permanent Disability:No    Plan: Medication  Comments:Continue Keflex, wound culture obtained, will initiate Bactrim.  Wound care discussed, work restrictions, return to clinic in 2 days.    Diagnostics: X-ray  Comments:Negative    Comments:       Disability Information   Status: Released to Restricted Duty    From:  11/11/2022  Through: 11/13/2022 Restrictions are: Temporary   Physical  Restrictions   Sitting:    Standing:    Stooping:    Bending:      Squatting:    Walking:    Climbing:    Pushing:  < or = to 1 hr/day   Pulling:  < or = to 1 hr/day Other:    Reaching Above Shoulder (L):   Reaching Above Shoulder (R):       Reaching Below Shoulder (L):    Reaching Below Shoulder (R):      Not to exceed Weight Limits   Carrying(hrs):   Weight Limit(lb): < or = to 10 pounds Lifting(hrs):   Weight  Limit(lb): < or = to 10 pounds   Comments: Patient has been instructed to keep left hand clean and dry with a bandage while at work.    Repetitive Actions   Hands: i.e. Fine Manipulations from Graspin hrs/day  Comments:Left hand   Feet: i.e. Operating Foot Controls:     Driving / Operate Machinery:     Health Care Provider’s Original or Electronic Signature  RENATA Ott Health Care Provider’s Original or Electronic Signature    Jonn Norris DO MPH     Clinic Name / Location: [address] [address]  [place] [address] Clinic Phone Number: Dept: [phone]   Appointment Time: 8:15 Am Visit Start Time: 8:19 AM   Check-In Time:  8:05 Am Visit Discharge Time: 9:21 AM    Original-Treating Physician or Chiropractor    Page 2-Insurer/TPA    Page 3-Employer    Page 4-Employee

## 2022-11-11 NOTE — PROGRESS NOTES
"  Chief Complaint   Patient presents with    Other     WC F[facility] DOI: 10/30/22 (L) index finger swollen, painful       HISTORY OF PRESENT ILLNESS: Patient is a pleasant 52 y.o. female who presents to urgent care today with a work comp follow up. DOI 11/3/22: Patient endorses crush injury to left index finger while at work.  She was seen in urgent care on 11/8/2022 for the same.  X-ray was performed without any bony findings.  She was treated with Keflex for early signs of paronychia.  The patient notes that since yesterday she has had significant increase in swelling and pain to distal aspect of her finger.  She otherwise feels well and denies any fever, chills, malaise.  She is right-hand dominant.      PMH: No pertinent past medical history to this problem  MEDS: Medications were reviewed in Epic  ALLERGIES: Allergies were reviewed in Epic  FH: No pertinent family history to this problem      ROS:  Review of Systems   Constitutional: Negative for fever, chills, weight loss, malaise, and fatigue.   HENT: Negative for ear pain, nosebleeds, congestion, sore throat and neck pain.    Eyes: Negative for vision changes.   Neuro: Negative for headache, sensory changes, weakness, seizure, LOC.   Cardiovascular: Negative for chest pain, palpitations, orthopnea and leg swelling.   Respiratory: Negative for cough, sputum production, shortness of breath and wheezing.   Gastrointestinal: Negative for abdominal pain, nausea, vomiting or diarrhea.   Genitourinary: Negative for dysuria, urgency and frequency.  Musculoskeletal: Positive for finger pain, swelling.  Negative for falls, neck pain, back pain, joint pain, myalgias.   Skin: Negative for rash, diaphoresis.     Exam:  /72 (BP Location: Right arm, Patient Position: Sitting, BP Cuff Size: Adult)   Pulse 80   Temp 36.1 °C (96.9 °F) (Temporal)   Resp 18   Ht 1.565 m (5' 1.61\")   Wt 78.2 kg (172 lb 8 oz)   SpO2 95%   General: well-nourished, well-developed female in " NAD  Head: normocephalic, atraumatic  Eyes: PERRLA, no conjunctival injection, acuity grossly intact, lids normal.  Ears: normal shape and symmetry, no tenderness, no discharge. External canals are without any significant edema or erythema. Tympanic membranes are without any inflammation, no effusion. Gross auditory acuity is intact.  Nose: symmetrical without tenderness, no discharge.  Mouth/Throat: reasonable hygiene, no erythema, exudates or tonsillar enlargement.  Neck: no masses, range of motion within normal limits, no tracheal deviation. No obvious thyroid enlargement.   Lymph: no cervical adenopathy. No supraclavicular adenopathy.   Neuro: alert and oriented. Cranial nerves 1-12 grossly intact. No sensory deficit.   Cardiovascular: regular rate and rhythm. No edema.  Pulmonary: no distress. Chest is symmetrical with respiration, no wheezes, crackles, or rhonchi.   Musculoskeletal: no clubbing, appropriate muscle tone, gait is stable. Left index finger: There is significant swelling with fluctuance noted to lateral and distal aspect of finger, adjacent to nailbed, area is tender, no lymphangitis.  Neurovascular is intact.  Finger has full range of motion.  Skin: warm, dry, intact, no clubbing, no cyanosis, no rashes.   Psych: appropriate mood, affect, judgement.     Procedure: Incision and Drainage  -Risks, benefits, and alternatives discussed. Risks include infection, bleeding, nerve damage, and poor cosmetic outcome  -Clean technique with sterile instruments  -Digital block anesthesia with 1% lidocaine   -Incision with #11 blade into fluctuant area with purulent material expressed  -Culture obtained and packaged for lab  -Cavity probed and any loculations bluntly taken down with hemostat  -Irrigated copiously with sterile saline  -Minimal bleeding with good hemostasis achieved  -The patient tolerated the procedure well      Assessment/Plan:  1. Crushing injury of left index finger, subsequent encounter         2. Paronychia of left index finger  CULTURE WOUND W/ GRAM STAIN    sulfamethoxazole-trimethoprim (BACTRIM DS) 800-160 MG tablet            Continue Keflex, wound culture obtained, will initiate Bactrim.  Wound care discussed, work restrictions, return to clinic in 2 days  Supportive care, differential diagnoses, and indications for immediate follow-up discussed with patient.   Pathogenesis of diagnosis discussed including typical length and natural progression.   Instructed to return to clinic or nearest emergency department sooner for any change in condition, further concerns, or worsening of symptoms.  Patient states understanding of the plan of care and discharge instructions.          Please note that this dictation was created using voice recognition software. I have made every reasonable attempt to correct obvious errors, but I expect that there are errors of grammar and possibly content that I did not discover before finalizing the note. Previous clinic visit encounter reviewed and considered in medical decision making today.         MIKE Ott.

## 2022-11-13 ENCOUNTER — OCCUPATIONAL MEDICINE (OUTPATIENT)
Dept: URGENT CARE | Facility: CLINIC | Age: 52
End: 2022-11-13
Payer: COMMERCIAL

## 2022-11-13 VITALS
RESPIRATION RATE: 20 BRPM | SYSTOLIC BLOOD PRESSURE: 102 MMHG | BODY MASS INDEX: 31.15 KG/M2 | OXYGEN SATURATION: 97 % | HEART RATE: 76 BPM | DIASTOLIC BLOOD PRESSURE: 78 MMHG | WEIGHT: 169.3 LBS | TEMPERATURE: 96.8 F | HEIGHT: 62 IN

## 2022-11-13 DIAGNOSIS — L03.012 PARONYCHIA OF LEFT INDEX FINGER: ICD-10-CM

## 2022-11-13 LAB
BACTERIA WND AEROBE CULT: ABNORMAL
BACTERIA WND AEROBE CULT: ABNORMAL
GRAM STN SPEC: ABNORMAL
SIGNIFICANT IND 70042: ABNORMAL
SITE SITE: ABNORMAL
SOURCE SOURCE: ABNORMAL

## 2022-11-13 PROCEDURE — 99213 OFFICE O/P EST LOW 20 MIN: CPT | Performed by: PHYSICIAN ASSISTANT

## 2022-11-13 NOTE — LETTER
[facility] [address] [facility] [place] [address]  Phone:  [phone] - Fax:  [phone]   [facility] Progress Report and Disability Certification  Date of Service: 11/13/2022   No Show:  No  Date / Time of Next Visit: 11/16/2022   Claim Information   Patient Name: Mary Beth Kiser  Claim Number:     Employer:  [employer]  Date of Injury: 11/3/2022     Insurer / TPA: Workers Choice  ID / SSN:     Occupation: DIETARY  Diagnosis: The encounter diagnosis was Paronychia of left index finger.    Medical Information   Related to Industrial Injury? Yes    Subjective Complaints:  Date of injury 11/3/2022: Patient presents for third urgent care visit following left index finger crush injury.  She subsequently developed a paronychia that was lanced 2 days ago.  At this time wound culture shows a preliminary result of staph aureus without sensitivity data.  The patient has been compliant with antibiotics and reports that she is significantly improved.  The pain and swelling have gone down and she has continued with it covered    Objective Findings: alert nontoxic female no acute distress.  fading paronychia with minimal tenderness, no active redness or purulence left index finger.  Full range of motion     Pre-Existing Condition(s):     Assessment:   Condition Improved    Status: Additional Care Required  Permanent Disability:No    Plan:      Diagnostics:      Comments:       Disability Information   Status: Released to Restricted Duty    From:  11/13/2022  Through: 11/16/2022 Restrictions are: Temporary   Physical Restrictions   Sitting:    Standing:    Stooping:    Bending:      Squatting:    Walking:    Climbing:    Pushing:      Pulling:    Other:    Reaching Above Shoulder (L):   Reaching Above Shoulder (R):       Reaching Below Shoulder (L):    Reaching Below Shoulder (R):      Not to exceed Weight Limits   Carrying(hrs):   Weight Limit(lb):    Lifting(hrs):   Weight  Limit(lb):     Comments: Keep finger covered at work, 10 pound weight limit, avoid activities that saturate the finger with liquids.  Complete antibiotics.  Consider warm soaks.    Repetitive Actions   Hands: i.e. Fine Manipulations from Grasping:     Feet: i.e. Operating Foot Controls:     Driving / Operate Machinery:     Health Care Provider’s Original or Electronic Signature  Obey Zuniga P.A.-C. Health Care Provider’s Original or Electronic Signature    Jonn Norris DO MPH     Clinic Name / Location: [address] [place] [address] Clinic Phone Number: Dept: [phone]   Appointment Time: 11:00 Am Visit Start Time: 11:17 AM   Check-In Time:  10:58 Am Visit Discharge Time:  11:42 AM   Original-Treating Physician or Chiropractor    Page 2-Insurer/TPA    Page 3-Employer    Page 4-Employee

## 2022-11-13 NOTE — PROGRESS NOTES
"Subjective:     Mary Beth Kiser is a 52 y.o. female who presents for Finger Injury (X WC FV left index finger injured oct 20)      Date of injury 11/3/2022: Patient presents for third urgent care visit following left index finger crush injury.  She subsequently developed a paronychia that was lanced 2 days ago.  At this time wound culture shows a preliminary result of staph aureus without sensitivity data.  The patient has been compliant with antibiotics and reports that she is significantly improved.  The pain and swelling have gone down and she has continued with it covered     PMH:   No pertinent past medical history to this problem  MEDS:  Medications were reviewed in EMR  ALLERGIES:  Allergies were reviewed in EMR  SOCHX:  Works as a dietary aide  FH:   No pertinent family history to this problem       Objective:     /78 (BP Location: Right arm, Patient Position: Sitting, BP Cuff Size: Adult long)   Pulse 76   Temp 36 °C (96.8 °F) (Temporal)   Resp 20   Ht 1.575 m (5' 2\") Comment: per pt  Wt 76.8 kg (169 lb 4.8 oz)   SpO2 97%   BMI 30.97 kg/m²     alert nontoxic female no acute distress.  fading paronychia with minimal tenderness, no active redness or purulence left index finger.  Full range of motion    Assessment/Plan:       1. Paronychia of left index finger    Released to Restricted Duty FROM 11/13/2022 TO 11/16/2022  Keep finger covered at work, 10 pound weight limit, avoid activities that saturate the finger with liquids.  Complete antibiotics.  Consider warm soaks.       Differential diagnosis, natural history, supportive care, and indications for immediate follow-up discussed.    "

## 2022-11-16 ENCOUNTER — OCCUPATIONAL MEDICINE (OUTPATIENT)
Dept: URGENT CARE | Facility: CLINIC | Age: 52
End: 2022-11-16
Payer: COMMERCIAL

## 2022-11-16 VITALS
OXYGEN SATURATION: 100 % | BODY MASS INDEX: 31.1 KG/M2 | HEIGHT: 62 IN | SYSTOLIC BLOOD PRESSURE: 102 MMHG | RESPIRATION RATE: 14 BRPM | WEIGHT: 169 LBS | DIASTOLIC BLOOD PRESSURE: 68 MMHG | HEART RATE: 82 BPM | TEMPERATURE: 97.5 F

## 2022-11-16 DIAGNOSIS — S67.191D CRUSHING INJURY OF LEFT INDEX FINGER, SUBSEQUENT ENCOUNTER: ICD-10-CM

## 2022-11-16 DIAGNOSIS — L03.012 PARONYCHIA OF LEFT INDEX FINGER: ICD-10-CM

## 2022-11-16 PROCEDURE — 99213 OFFICE O/P EST LOW 20 MIN: CPT | Mod: 24 | Performed by: PHYSICIAN ASSISTANT

## 2022-11-16 ASSESSMENT — ENCOUNTER SYMPTOMS
FEVER: 0
TINGLING: 0
CHILLS: 0
FOCAL WEAKNESS: 0
WEAKNESS: 0
SENSORY CHANGE: 0

## 2022-11-16 NOTE — PROGRESS NOTES
"Subjective     Mary Beth Kiser is a 52 y.o. female who presents with Work-Related Injury ( FV DOI: 11/03/22 L INDEX FINGER INJURY)      11/3/2022. Patient is here for 4th  visit following a left index finger crush injury. She subsequently developed a paronychia that was lanced 5 days ago. The patient's culture showed Staph Aureus which is sensitive to her current antibiotic. She is taking Bactrim DS. She will finish the antibiotic in 4 days. She reports significant improvement in her finger. She has had improved swelling and pain. She does have occasional pain at times. No fever or chills. No numbness or tingling.     HPI      No past medical history on file.      No past surgical history on file.      No family history on file.      Patient has no known allergies.      .Medications, Allergies, and current problem list reviewed today in Epic    Review of Systems   Constitutional:  Negative for chills, fever and malaise/fatigue.   Musculoskeletal:  Positive for joint pain (left index finger).   Skin:         Bruising and pain around left index fingernail   Neurological:  Negative for tingling, sensory change, focal weakness and weakness.      All other systems reviewed and are negative.         Objective     /68   Pulse 82   Temp 36.4 °C (97.5 °F) (Temporal)   Resp 14   Ht 1.575 m (5' 2\")   Wt 76.7 kg (169 lb)   SpO2 100%   BMI 30.91 kg/m²      Physical Exam  Constitutional:       General: She is not in acute distress.     Appearance: She is not ill-appearing.   HENT:      Head: Normocephalic and atraumatic.   Eyes:      Conjunctiva/sclera: Conjunctivae normal.   Cardiovascular:      Rate and Rhythm: Normal rate and regular rhythm.   Pulmonary:      Effort: Pulmonary effort is normal. No respiratory distress.      Breath sounds: No stridor. No wheezing.   Skin:     General: Skin is warm and dry.   Neurological:      General: No focal deficit present.      Mental Status: She is alert and oriented " to person, place, and time.   Psychiatric:         Mood and Affect: Mood normal.         Behavior: Behavior normal.         Thought Content: Thought content normal.         Judgment: Judgment normal.       Left index finger: FROM. NTTP. Ecchymosis noted on skin surrounding fingernail. Distal n/v intact.                    Assessment & Plan        1. Crushing injury of left index finger, subsequent encounter        2. Paronychia of left index finger          Continue and finish antibiotics.  Keep wound clean and covered.  Continue current restrictions.   RTC in 4 days on last day of antibiotic therapy. If doing well- anticipate Discharge/MMI    Differential diagnoses, Supportive care, and indications for immediate follow-up discussed with patient.   Pathogenesis of diagnosis discussed including typical length and natural progression.   Instructed to return to clinic or nearest emergency department for any change in condition, further concerns, or worsening of symptoms.    The patient demonstrated a good understanding and agreed with the treatment plan.      Mary Park P.A.-C.

## 2022-11-16 NOTE — LETTER
[facility] [place] [id number] [address] [place] [address]  Phone:  [phone] - Fax:  [phone]   [facility] Progress Report and Disability Certification  Date of Service: 11/16/2022   No Show:  No  Date / Time of Next Visit: 11/20/2022 @ 11:00 AM    Claim Information   Patient Name: Mary Beth Kiser  Claim Number:     Employer:  [employer]  Date of Injury: 11/3/2022     Insurer / TPA: Workers Choice  ID / SSN:     Occupation: DIETARY  Diagnosis: Diagnoses of Crushing injury of left index finger, subsequent encounter and Paronychia of left index finger were pertinent to this visit.    Medical Information   Related to Industrial Injury? Yes    Subjective Complaints:  11/3/2022. Patient is here for 4th  visit following a left index finger crush injury. She subsequently developed a paronychia that was lanced 5 days ago. The patient's culture showed Staph Aureus which is sensitive to her current antibiotic. She is taking Bactrim DS. She will finish the antibiotic in 4 days. She reports significant improvement in her finger. She has had improved swelling and pain. She does have occasional pain at times. No fever or chills. No numbness or tingling.   Objective Findings: Left index finger: FROM. NTTP. Ecchymosis noted on skin surrounding fingernail. Distal n/v intact.    Pre-Existing Condition(s):     Assessment:   Condition Improved    Status: Additional Care Required  Permanent Disability:No    Plan: Medication  Comments:Continue and finish Bactrim Ds. Keep finger dry and covered at work. 10lb weight limit. avoid getting the finger wet. RTC 4 days on last day of antibiotic therapy.    Diagnostics:      Comments:       Disability Information   Status: Released to Restricted Duty    From:  11/16/2022  Through: 11/20/2022 Restrictions are: Temporary   Physical Restrictions   Sitting:    Standing:    Stooping:    Bending:      Squatting:    Walking:     Climbing:    Pushing:      Pulling:    Other:    Reaching Above Shoulder (L):   Reaching Above Shoulder (R):       Reaching Below Shoulder (L):    Reaching Below Shoulder (R):      Not to exceed Weight Limits   Carrying(hrs):   Weight Limit(lb):   Lifting(hrs):   Weight  Limit(lb):     Comments: See Plan above regarding restrictions    Repetitive Actions   Hands: i.e. Fine Manipulations from Grasping:     Feet: i.e. Operating Foot Controls:     Driving / Operate Machinery:     Health Care Provider’s Original or Electronic Signature  Mary Park P.A.-C. Health Care Provider’s Original or Electronic Signature    Jonn Norris DO MPH     Clinic Name / Location: [place] [address]  [place] [address] Clinic Phone Number: Dept: [phone]   Appointment Time: 10:30 Am Visit Start Time: 10:46 AM   Check-In Time:  10:33 Am Visit Discharge Time:  11:11 AM    Original-Treating Physician or Chiropractor    Page 2-Insurer/TPA    Page 3-Employer    Page 4-Employee

## 2022-11-20 ENCOUNTER — OCCUPATIONAL MEDICINE (OUTPATIENT)
Dept: URGENT CARE | Facility: CLINIC | Age: 52
End: 2022-11-20
Payer: COMMERCIAL

## 2022-11-20 VITALS
WEIGHT: 169 LBS | HEART RATE: 61 BPM | HEIGHT: 62 IN | OXYGEN SATURATION: 97 % | DIASTOLIC BLOOD PRESSURE: 58 MMHG | TEMPERATURE: 97.1 F | BODY MASS INDEX: 31.1 KG/M2 | RESPIRATION RATE: 14 BRPM | SYSTOLIC BLOOD PRESSURE: 100 MMHG

## 2022-11-20 DIAGNOSIS — S67.191D CRUSHING INJURY OF LEFT INDEX FINGER, SUBSEQUENT ENCOUNTER: ICD-10-CM

## 2022-11-20 DIAGNOSIS — L03.012 PARONYCHIA OF LEFT INDEX FINGER: ICD-10-CM

## 2022-11-20 PROCEDURE — 99213 OFFICE O/P EST LOW 20 MIN: CPT | Performed by: PHYSICIAN ASSISTANT

## 2022-11-20 NOTE — LETTER
[address] [place] [address]  Phone:  [phone] - Fax:  [phone]   [facility] Progress Report and Disability Certification  Date of Service: 11/20/2022   No Show:  No  Date / Time of Next Visit:     Claim Information   Patient Name: Mary Beth Kiser  Claim Number:     Employer:  [employer]  Date of Injury: 11/3/2022     Insurer / TPA: Workers Choice  ID / SSN:     Occupation: DIETARY  Diagnosis: Diagnoses of Crushing injury of left index finger, subsequent encounter and Paronychia of left index finger were pertinent to this visit.    Medical Information   Related to Industrial Injury? Yes    Subjective Complaints:  DOI: 11/3/2022. Patient is here for 5th  visit following a left index finger crush injury.  Please recall that she underwent paronychial drainage on 11/13/2022 and she has completed the subsequent antibiotic course.  Overall she is continuing to see symptom improvement since her last office visit.  She has been tolerating her work restrictions and does feel that she is able to be fully released at this time.   Objective Findings: Small amount of dried blood present underneath the skin over the lateral border of the affected digit, area is nonpainful.  Range of motion of the finger is intact.  Distal vascular function intact.   Pre-Existing Condition(s):     Assessment:   Condition Improved    Status: Discharged /  MMI  Permanent Disability:No    Plan:   Comments:Discharge MMI at this time    Diagnostics:      Comments:       Disability Information   Status: Released to Full Duty    From:     Through:   Restrictions are:     Physical Restrictions   Sitting:    Standing:    Stooping:    Bending:      Squatting:    Walking:    Climbing:    Pushing:      Pulling:    Other:    Reaching Above Shoulder (L):   Reaching Above Shoulder (R):       Reaching Below Shoulder (L):    Reaching Below Shoulder (R):      Not to  exceed Weight Limits   Carrying(hrs):   Weight Limit(lb):   Lifting(hrs):   Weight  Limit(lb):     Comments:      Repetitive Actions   Hands: i.e. Fine Manipulations from Grasping:     Feet: i.e. Operating Foot Controls:     Driving / Operate Machinery:     Health Care Provider’s Original or Electronic Signature  Óscar Mariscal P.A.-C. Health Care Provider’s Original or Electronic Signature    Jonn Norris DO MPH     Clinic Name / Location: [place] [address]  [place] [address] Clinic Phone Number: Dept: [phone]   Appointment Time: 11:00 Am Visit Start Time: 11:20 AM   Check-In Time:  10:57 Am Visit Discharge Time:     Original-Treating Physician or Chiropractor    Page 2-Insurer/TPA    Page 3-Employer    Page 4-Employee

## 2022-11-20 NOTE — PROGRESS NOTES
"Subjective:     Mary Beth Kiser is a 52 y.o. female who presents for Follow-Up (WC FV/L 1st digit injury, smashed in the kitchen, skin is now peeling and pain is worse.)      DOI: 11/3/2022. Patient is here for 5th  visit following a left index finger crush injury.  Please recall that she underwent paronychial drainage on 11/13/2022 and she has completed the subsequent antibiotic course.  Overall she is continuing to see symptom improvement since her last office visit.  She has been tolerating her work restrictions and does feel that she is able to be fully released at this time.    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  Range of motion of the finger is intact.  Distal vascular function intact.    Assessment/Plan:     Encounter Diagnoses   Name Primary?    Crushing injury of left index finger, subsequent encounter     Paronychia of left index finger      Plan:  Discharge MMI at this time    Differential diagnosis, natural history, supportive care, and indications for immediate follow-up discussed.    Óscar Mariscal PA-C    "

## 2022-11-20 NOTE — LETTER
[place] [address] [facility] [place] [address]  Phone:  [phone] - Fax:  [phone]   [facility] Progress Report and Disability Certification  Date of Service: 11/20/2022   No Show:  No  Date / Time of Next Visit:     Claim Information   Patient Name: Mary Beth Kiser  Claim Number:     Employer: [employer]  Date of Injury: 11/3/2022     Insurer / TPA: Workers Choice  ID / SSN:     Occupation: DIETARY  Diagnosis: There were no encounter diagnoses.    Medical Information   Related to Industrial Injury? Yes    Subjective Complaints:  DOI: 11/3/2022. Patient is here for 5th  visit following a left index finger crush injury.  Please recall that she underwent paronychial drainage on 11/13/2022 and she has completed the subsequent antibiotic course.  Overall she is continuing to see symptom improvement since her last office visit.  She has been tolerating her work restrictions and does feel that she is able to be fully released at this time.   Objective Findings: Small amount of dried blood present underneath the skin over the lateral border of the affected digit, area is nonpainful.  Range of motion of the finger is intact.  Distal vascular function intact.   Pre-Existing Condition(s):     Assessment:   Condition Improved    Status: Discharged /  MMI  Permanent Disability:No    Plan:   Comments:Discharge MMI at this time    Diagnostics:      Comments:       Disability Information   Status: Released to Full Duty    From:     Through:   Restrictions are:     Physical Restrictions   Sitting:    Standing:    Stooping:    Bending:      Squatting:    Walking:    Climbing:    Pushing:      Pulling:    Other:    Reaching Above Shoulder (L):   Reaching Above Shoulder (R):       Reaching Below Shoulder (L):    Reaching Below Shoulder (R):      Not to exceed Weight Limits   Carrying(hrs):   Weight Limit(lb):   Lifting(hrs):   Weight  Limit(lb):     Comments:       Repetitive Actions   Hands: i.e. Fine Manipulations from Grasping:     Feet: i.e. Operating Foot Controls:     Driving / Operate Machinery:     Health Care Provider’s Original or Electronic Signature  Óscar Mariscal P.A.-C. Health Care Provider’s Original or Electronic Signature    Jonn Norris DO MPH     Clinic Name / Location: [place] [address]  [place] [address] Clinic Phone Number: Dept: [phone]   Appointment Time: 11:00 Am Visit Start Time: 11:20 AM   Check-In Time:  10:57 Am Visit Discharge Time:     Original-Treating Physician or Chiropractor    Page 2-Insurer/TPA    Page 3-Employer    Page 4-Employee

## 2023-09-28 ENCOUNTER — IMMUNIZATION (OUTPATIENT)
Dept: OCCUPATIONAL MEDICINE | Facility: CLINIC | Age: 53
End: 2023-09-28

## 2023-09-28 DIAGNOSIS — Z23 NEED FOR VACCINATION: Primary | ICD-10-CM

## 2023-09-28 PROCEDURE — 90686 IIV4 VACC NO PRSV 0.5 ML IM: CPT | Performed by: PREVENTIVE MEDICINE

## 2024-09-26 ENCOUNTER — APPOINTMENT (OUTPATIENT)
Dept: OCCUPATIONAL MEDICINE | Facility: CLINIC | Age: 54
End: 2024-09-26

## 2024-09-26 DIAGNOSIS — Z23 NEED FOR VACCINATION: Primary | ICD-10-CM

## 2024-10-01 PROCEDURE — 90656 IIV3 VACC NO PRSV 0.5 ML IM: CPT | Performed by: PHARMACIST
